# Patient Record
Sex: MALE | Race: WHITE | NOT HISPANIC OR LATINO | Employment: OTHER | URBAN - METROPOLITAN AREA
[De-identification: names, ages, dates, MRNs, and addresses within clinical notes are randomized per-mention and may not be internally consistent; named-entity substitution may affect disease eponyms.]

---

## 2023-11-19 ENCOUNTER — HOSPITAL ENCOUNTER (EMERGENCY)
Facility: HOSPITAL | Age: 43
Discharge: HOME | End: 2023-11-19
Attending: EMERGENCY MEDICINE

## 2023-11-19 VITALS
TEMPERATURE: 98 F | BODY MASS INDEX: 19.89 KG/M2 | OXYGEN SATURATION: 100 % | HEIGHT: 74 IN | RESPIRATION RATE: 18 BRPM | DIASTOLIC BLOOD PRESSURE: 93 MMHG | WEIGHT: 155 LBS | HEART RATE: 94 BPM | SYSTOLIC BLOOD PRESSURE: 138 MMHG

## 2023-11-19 DIAGNOSIS — M54.12 CERVICAL RADICULOPATHY: Primary | ICD-10-CM

## 2023-11-19 PROCEDURE — 99284 EMERGENCY DEPT VISIT MOD MDM: CPT

## 2023-11-19 RX ORDER — HYDROCODONE BITARTRATE AND ACETAMINOPHEN 5; 325 MG/1; MG/1
1 TABLET ORAL EVERY 6 HOURS PRN
Qty: 12 TABLET | Refills: 0 | Status: SHIPPED | OUTPATIENT
Start: 2023-11-19

## 2023-11-19 RX ORDER — IBUPROFEN 600 MG/1
600 TABLET ORAL EVERY 6 HOURS PRN
Qty: 20 TABLET | Refills: 0 | Status: SHIPPED | OUTPATIENT
Start: 2023-11-19

## 2023-11-19 RX ORDER — PREDNISONE 20 MG/1
20 TABLET ORAL 2 TIMES DAILY
Qty: 10 TABLET | Refills: 0 | Status: SHIPPED | OUTPATIENT
Start: 2023-11-19 | End: 2023-11-24

## 2023-11-19 RX ORDER — PREDNISONE 20 MG/1
20 TABLET ORAL 2 TIMES DAILY
Qty: 10 TABLET | Refills: 0 | Status: SHIPPED | OUTPATIENT
Start: 2023-11-19 | End: 2023-11-19 | Stop reason: SDUPTHER

## 2023-11-19 NOTE — ED TRIAGE NOTES
Neck Pain Pt to er c/o neck pain and left shoulder pain onset one month ago. States also has intermittent numbness to left arm.

## 2023-11-20 NOTE — ED PROVIDER NOTES
Encounter Date: 11/19/2023       History     Chief Complaint   Patient presents with    Neck Pain     Pt to er c/o neck pain and left shoulder pain onset one month ago. States also has intermittent numbness to left arm.     39Male complains of left sided neck, pain, radiating into the left shoulder, which started three weeks ago when he was lifting a bunch of items to move. He does not remember anyone specific injury, but states that after doing a lot of lifting, the symptoms began. When the symptoms for started, he couldn't turn his neck at all. He states he still has rebound reduced mobility of his neck. He has no weakness of his upper extremities, but does have numbness along the form, extending to the thumb and index finger.        Review of patient's allergies indicates:  No Known Allergies  History reviewed. No pertinent past medical history.  History reviewed. No pertinent surgical history.  No family history on file.     Review of Systems   Musculoskeletal:         Neck pain   All other systems reviewed and are negative.      Physical Exam     Initial Vitals [11/19/23 1028]   BP Pulse Resp Temp SpO2   (!) 138/93 94 18 97.7 °F (36.5 °C) 100 %      MAP       --         Physical Exam    Nursing note and vitals reviewed.  Constitutional: He appears well-developed and well-nourished.   Neck:   No tenderness noted to the C-spine or paraspinous muscles. No tenderness noted to the upper back. Range of motion is decreased to the neck with patient complaining of stiffness to the neck with movement. Full strength and sensation to the upper extremities, including normal strength and the bicep, tricep, deltoid, and .Radial pulses, 2+ with normal sensation, delight, touch to the hand.   Cardiovascular:  Normal rate and regular rhythm.           Pulmonary/Chest: Breath sounds normal. No respiratory distress.     Neurological: He is alert and oriented to person, place, and time. No sensory deficit. GCS score is 15. GCS  eye subscore is 4. GCS verbal subscore is 5. GCS motor subscore is 6.   Skin: Capillary refill takes less than 2 seconds.   Psychiatric: He has a normal mood and affect. Thought content normal.         ED Course   Procedures  Labs Reviewed - No data to display       Imaging Results              X-Ray Cervical Spine 2 or 3 Views (Final result)  Result time 11/19/23 11:49:26      Final result by Isma Coleman MD (11/19/23 11:49:26)                   Impression:      No acute radiographic findings.      Electronically signed by: Isma Coleman  Date:    11/19/2023  Time:    11:49               Narrative:    EXAMINATION:  XR CERVICAL SPINE 2 OR 3 VIEWS    CLINICAL HISTORY:  neck pain;    COMPARISON:  No priors    FINDINGS:  Frontal, lateral and open mouth views of the cervical spine. There is no subluxation. The odontoid appears grossly intact and the C1-C2 relationship normal on the open mouth view. There is no prevertebral soft tissue swelling.  There are mild degenerative changes.                                       Medications - No data to display  Medical Decision Making  39Male complains of left sided neck, pain, radiating into the left shoulder, which started three weeks ago when he was lifting a bunch of items to move. He does not remember anyone specific injury, but states that after doing a lot of lifting, the symptoms began. When the symptoms for started, he couldn't turn his neck at all. He states he still has rebound reduced mobility of his neck. He has no weakness of his upper extremities, but does have numbness along the form, extending to the thumb and index finger.      Differential diagnosis includes, but is not limited to cervical radiculopathy, rotator cuff injury, musculoskeletal strain.    Amount and/or Complexity of Data Reviewed  Radiology: ordered.  Discussion of management or test interpretation with external provider(s): Patient was seen in evaluated in the emergency department with  history, physical exam, sees my x-ray. I believe his symptoms most likely are due to cervical radiculopathy based on the pain starting in neck, and radiating through the whole arm with numbness along the thumb and index finger I will treat him for his pain and recommend he follow up with his PCP. If his symptoms do not resolve, he may need to have an MRI of the neck.    Risk  Prescription drug management.                                   Clinical Impression:  Final diagnoses:  [M54.12] Cervical radiculopathy (Primary)          ED Disposition Condition    Discharge Stable          ED Prescriptions       Medication Sig Dispense Start Date End Date Auth. Provider    ibuprofen (ADVIL,MOTRIN) 600 MG tablet Take 1 tablet (600 mg total) by mouth every 6 (six) hours as needed for Pain. 20 tablet 11/19/2023 -- Deepthi Santos MD    HYDROcodone-acetaminophen (NORCO) 5-325 mg per tablet Take 1 tablet by mouth every 6 (six) hours as needed for Pain. 12 tablet 11/19/2023 -- Deepthi Santos MD    predniSONE (DELTASONE) 20 MG tablet  (Status: Discontinued) Take 1 tablet (20 mg total) by mouth 2 (two) times daily. for 5 days 10 tablet 11/19/2023 11/19/2023 Deepthi Santos MD    predniSONE (DELTASONE) 20 MG tablet Take 1 tablet (20 mg total) by mouth 2 (two) times daily. for 5 days 10 tablet 11/19/2023 11/24/2023 Deepthi Santos MD          Follow-up Information       Follow up With Specialties Details Why Contact Info    PCP  Schedule an appointment as soon as possible for a visit                Deepthi Santos MD  11/19/23 1684